# Patient Record
Sex: FEMALE
[De-identification: names, ages, dates, MRNs, and addresses within clinical notes are randomized per-mention and may not be internally consistent; named-entity substitution may affect disease eponyms.]

---

## 2019-07-08 ENCOUNTER — HOSPITAL ENCOUNTER (OUTPATIENT)
Dept: HOSPITAL 5 - SPVIMAG | Age: 54
Discharge: HOME | End: 2019-07-08
Attending: INTERNAL MEDICINE
Payer: MEDICARE

## 2019-07-08 DIAGNOSIS — R05: Primary | ICD-10-CM

## 2019-07-08 PROCEDURE — 71046 X-RAY EXAM CHEST 2 VIEWS: CPT

## 2019-07-08 NOTE — XRAY REPORT
CHEST 2 VIEWS 



INDICATION: 

Productive cough.



COMPARISON: 

None.



FINDINGS:



Heart: Within normal limits. 

Pulmonary vasculature: Normal.

Lungs/pleura: Very mild left lower lobe patchy opacities on both views. No pulmonary consolidation. T
he lungs are otherwise clear. No pleural effusion. No pneumothorax.



IMPRESSION:

Mild patchy opacities of the left lower lobe which may indicate a mild pneumonia.



Signer Name: Tremaine Smith MD 

Signed: 7/8/2019 4:07 PM

 Workstation Name: [id number]

## 2019-07-15 ENCOUNTER — HOSPITAL ENCOUNTER (OUTPATIENT)
Dept: HOSPITAL 5 - SPVIMAG | Age: 54
Discharge: HOME | End: 2019-07-15
Attending: INTERNAL MEDICINE
Payer: MEDICARE

## 2019-07-15 DIAGNOSIS — J15.9: Primary | ICD-10-CM

## 2019-07-15 PROCEDURE — 71046 X-RAY EXAM CHEST 2 VIEWS: CPT

## 2019-07-15 NOTE — XRAY REPORT
PA AND LATERAL CXR



HISTORY: Follow-up left lower lobe pneumonia.



COMPARISON: 7/8/2019



FINDINGS:

Cardiomediastinal silhouette: Normal cardiac size.  Normal mediastinal contours.

Lungs: Normal expansion.  Normal lung aeration. Previously identified left lower lobe patchy opacitie
s have resolved. No airspace disease. No pleural effusions.  No pneumothorax.



Pulmonary vascularity: Normal.



Support hardware: None.



Additional findings: None.



IMPRESSION:

1. Resolution of left lower lobe pneumonia.



Signer Name: Tremaine Smith MD 

Signed: 7/15/2019 1:42 PM

 Workstation Name: [id number]